# Patient Record
(demographics unavailable — no encounter records)

---

## 2024-12-03 NOTE — PHYSICAL EXAM
[Confidentiality] : confidentiality [STD (testing, results, tx)] : STD (testing, results, tx) [General Appearance - Alert] : alert [General Appearance - In No Acute Distress] : in no acute distress [General Appearance - Well Nourished] : well nourished [General Appearance - Well Developed] : well developed [General Appearance - Well-Appearing] : well appearing [Appearance Of Head] : the head was normocephalic [Facies] : there were no dysmorphic facial features [Sclera] : the conjunctiva were normal [Outer Ear] : the ears and nose were normal in appearance [Examination Of The Oral Cavity] : mucous membranes were moist and pink [Auscultation Breath Sounds / Voice Sounds] : breath sounds clear to auscultation bilaterally [Normal Chest Appearance] : the chest was normal in appearance [Apical Impulse] : quiet precordium with normal apical impulse [Heart Rate And Rhythm] : normal heart rate and rhythm [Heart Sounds] : normal S1 and S2 [No Murmur] : no murmurs  [Heart Sounds Gallop] : no gallops [Heart Sounds Pericardial Friction Rub] : no pericardial rub [Edema] : no edema [Arterial Pulses] : normal upper and lower extremity pulses with no pulse delay [Heart Sounds Click] : no clicks [Capillary Refill Test] : normal capillary refill [Bowel Sounds] : normal bowel sounds [Abdomen Soft] : soft [Nondistended] : nondistended [Abdomen Tenderness] : non-tender [Nail Clubbing] : no clubbing  or cyanosis of the fingers [Motor Tone] : normal muscle strength and tone [Cervical Lymph Nodes Enlarged Anterior] : The anterior cervical nodes were normal [Cervical Lymph Nodes Enlarged Posterior] : The posterior cervical nodes were normal [] : no rash [Skin Lesions] : no lesions [Skin Turgor] : normal turgor [Demonstrated Behavior - Infant Nonreactive To Parents] : interactive [Mood] : mood and affect were appropriate for age [Demonstrated Behavior] : normal behavior [FreeTextEntry7] : Femoral Pulse +2 B/L; Posterior tibial pulse +2 B/L

## 2024-12-03 NOTE — DISCUSSION/SUMMARY
[FreeTextEntry1] : JEFERSON  is a healthy, thriving 1 month old who presents without identified concerns for congestive heart failure nor impaired cardiac output by his  past medical history nor on his  current physical exam. his  EKG and echocardiogram were further reassuring.   - JEFERSON was incidentally noted to have physiologic tricuspid, pulmonary regurgitation in otherwise well functioning valves. This was not audible on physical exam and not hemodynamically significant at this time.   - Mild flow acceleration into the right pulmonary artery; < 2 m/s consistent with physiologic pulmonary artery stenosis; no appreciable correlating murmur on physical exam. Will re-evaluate at follow up for resolution or sooner should murmur develop.  -Isolated family member on both maternal (MGF) and paternal (PGF) side of an aortic aneurysm; not otherwise specified. Denies known history of connective tissue disorder nor bicuspid aortic valve and impacted family members later in life. I explained potential for inheritable causes but difficult in assessing risk for Jeferson in the absence of a further diagnosis. Recommended screening of both his parents. Reassuring echo today in Jeferson but does not exclude future development.    I recommended 1 year follow up and we reviewed signs and symptoms that should prompt medical attention and sooner evaluation. Above information explained in detail with assistance of a colored diagram. Jeferson and family verbalized their understanding and all questions were answered.    [Needs SBE Prophylaxis] : [unfilled] does not need bacterial endocarditis prophylaxis [May participate in all age-appropriate activities] : [unfilled] May participate in all age-appropriate activities.

## 2024-12-03 NOTE — CONSULT LETTER
[Today's Date] : [unfilled] [Name] : Name: [unfilled] [] : : ~~ [Today's Date:] : [unfilled] [Consult] : I had the pleasure of evaluating your patient, [unfilled]. My full evaluation follows. [Consult - Single Provider] : Thank you very much for allowing me to participate in the care of this patient. If you have any questions, please do not hesitate to contact me. [Sincerely,] : Sincerely, [Dear  ___:] : Dear Dr. [unfilled]: [FreeTextEntry4] : Ana Acosta MD [FreeTextEntry5] : [address] [FreeTextEntry6] : [place] [address] [de-identified] : Edis Escobar DO MPH Pediatric Cardiology [facility]

## 2024-12-03 NOTE — CARDIOLOGY SUMMARY
[LVSF ___%] : LV Shortening Fraction [unfilled]% [de-identified] : 11/25/24 [FreeTextEntry1] : Normal sinus rhythm @ 139 bpm MA: 98 ms QRS: 60 ms  QTc: 423 ms P-R-T Axis (-50) Normal voltage and intervals No ST segment abnormalities No pre-excitation  [de-identified] : 11/25/24 [FreeTextEntry2] :  A complete 2D, M-mode, doppler and color flow doppler transthoracic pediatric echocardiogram was performed. The intracardiac anatomy and doppler flow profiles were otherwise normal appearing with the following:   Summary: 1. Acceleration of right pulmonary artery flow velocity < 2m/s, consistent with physiologic peripheral pulmonary stenosis. 2. No evidence of a significant atrial septal defect. 3. Physiologic tricuspid valve regurgitation. 4. Physiologic pulmonary valve regurgitation. 5. Normal right ventricular morphology with qualitatively normal size and systolic function. 6. Normal left ventricular size, morphology and systolic function. 7. No pericardial effusion.

## 2024-12-03 NOTE — REVIEW OF SYSTEMS
[Nl] : no feeding issues at this time. [___ Formula] : [unfilled] Formula  [Breastmilk] : Breastmilk ~M [___ ounces/feeding] : ~XIMENA dick/feeding [Acting Fussy] : not acting ~L fussy [Fever] : no fever [Wgt Loss (___ Lbs)] : no recent weight loss [Pallor] : not pale [Discharge] : no discharge [Redness] : no redness [Nasal Discharge] : no nasal discharge [Nasal Stuffiness] : no nasal congestion [Stridor] : no stridor [Cyanosis] : no cyanosis [Edema] : no edema [Diaphoresis] : not diaphoretic [Tachypnea] : not tachypneic [Wheezing] : no wheezing [Cough] : no cough [Being A Poor Eater] : not a poor eater [Vomiting] : no vomiting [Diarrhea] : no diarrhea [Decrease In Appetite] : appetite not decreased [Fainting (Syncope)] : no fainting [Dec Consciousness] :  no decrease in consciousness [Seizure] : no seizures [Hypotonicity (Flaccid)] : not hypotonic [Refusal to Bear Wgt] : normal weight bearing [Puffy Hands/Feet] : no hand/feet puffiness [Rash] : no rash [Hemangioma] : no hemangioma [Jaundice] : no jaundice [Wound problems] : no wound problems [Bruising] : no tendency for easy bruising [Swollen Glands] : no lymphadenopathy [Enlarged [place]] : the fontanelle was not enlarged [Hoarse Cry] : no hoarse cry [Failure To Thrive] : no failure to thrive [Penis Circumcised] : not circumcised [Undescended Testes] : no undescended testicle [Ambiguous Genitals] : genitals not ambiguous [Dec Urine Output] : no oliguria [Solid Foods] : No solid food at this time [FreeTextEntry3] : every 2 hrs.

## 2024-12-03 NOTE — REVIEW OF SYSTEMS
[Nl] : no feeding issues at this time. [___ Formula] : [unfilled] Formula  [Breastmilk] : Breastmilk ~M [___ ounces/feeding] : ~XIMENA dick/feeding [Acting Fussy] : not acting ~L fussy [Fever] : no fever [Wgt Loss (___ Lbs)] : no recent weight loss [Pallor] : not pale [Discharge] : no discharge [Redness] : no redness [Nasal Discharge] : no nasal discharge [Nasal Stuffiness] : no nasal congestion [Stridor] : no stridor [Cyanosis] : no cyanosis [Edema] : no edema [Diaphoresis] : not diaphoretic [Tachypnea] : not tachypneic [Wheezing] : no wheezing [Cough] : no cough [Being A Poor Eater] : not a poor eater [Vomiting] : no vomiting [Diarrhea] : no diarrhea [Decrease In Appetite] : appetite not decreased [Fainting (Syncope)] : no fainting [Dec Consciousness] :  no decrease in consciousness [Seizure] : no seizures [Hypotonicity (Flaccid)] : not hypotonic [Refusal to Bear Wgt] : normal weight bearing [Puffy Hands/Feet] : no hand/feet puffiness [Rash] : no rash [Hemangioma] : no hemangioma [Jaundice] : no jaundice [Wound problems] : no wound problems [Bruising] : no tendency for easy bruising [Swollen Glands] : no lymphadenopathy [Enlarged [place]] : the fontanelle was not enlarged [Hoarse Cry] : no hoarse cry [Failure To Thrive] : no failure to thrive [Penis Circumcised] : not circumcised [Undescended Testes] : no undescended testicle [Ambiguous Genitals] : genitals not ambiguous [Dec Urine Output] : no oliguria [Solid Foods] : No solid food at this time [FreeTextEntry3] : every 2 hrs.

## 2024-12-03 NOTE — REASON FOR VISIT
[Initial Evaluation] : an initial evaluation of [Parents] : parents [FreeTextEntry3] : fetal follow up

## 2024-12-03 NOTE — CARDIOLOGY SUMMARY
[LVSF ___%] : LV Shortening Fraction [unfilled]% [de-identified] : 11/25/24 [FreeTextEntry1] : Normal sinus rhythm @ 139 bpm FL: 98 ms QRS: 60 ms  QTc: 423 ms P-R-T Axis (-50) Normal voltage and intervals No ST segment abnormalities No pre-excitation  [de-identified] : 11/25/24 [FreeTextEntry2] :  A complete 2D, M-mode, doppler and color flow doppler transthoracic pediatric echocardiogram was performed. The intracardiac anatomy and doppler flow profiles were otherwise normal appearing with the following:   Summary: 1. Acceleration of right pulmonary artery flow velocity < 2m/s, consistent with physiologic peripheral pulmonary stenosis. 2. No evidence of a significant atrial septal defect. 3. Physiologic tricuspid valve regurgitation. 4. Physiologic pulmonary valve regurgitation. 5. Normal right ventricular morphology with qualitatively normal size and systolic function. 6. Normal left ventricular size, morphology and systolic function. 7. No pericardial effusion.

## 2024-12-03 NOTE — HISTORY OF PRESENT ILLNESS
[FreeTextEntry1] : George is a well appearing, alert one month old who presents for a follow up evaluation for his post- evaluation after having had a fetal echocardiogram. He presents doing well.  Fetal echo report reviewed ( Dr. Dykes); no reported abnormalities. Indication twin gestation with 3VV. C/s. No reported prenatal complications.  George presents doing well. There has been no unexplained crying or irritability to suggest chest pain or palpitations. There has been no cyanosis, shortness of breath, dizziness, lightheadedness, syncope or near syncope. No reported history of feeding difficulties. No associated increased work of breathing, prolonged feeding duration, diaphoresis or early fatigue. Interactive, alert; without excessive fatigue or activity induced symptoms. Good appetite, remaining well hydrated. Normal urine output. No reported concerns with growth or development. There has been no recent fevers, illnesses or hospitalizations. No known sick contacts. Multiple hemangioma: abdomen, head, foot. Observation for now. No treatment currently.   Dad: Innocent heart murmur; no recent cardiac evaluation Mom: No recent cardiac evaluation MGF: AA PGF: AA  No additionally reported family history of an arrhythmia, aortic aneurysm, unexplained death, bicuspid aortic valve, congenital heart disease, cardiomyopathy or sudden cardiac death, long QT syndrome, drowning or unexplained accidental death.

## 2024-12-03 NOTE — CONSULT LETTER
[Today's Date] : [unfilled] [Name] : Name: [unfilled] [] : : ~~ [Today's Date:] : [unfilled] [Consult] : I had the pleasure of evaluating your patient, [unfilled]. My full evaluation follows. [Consult - Single Provider] : Thank you very much for allowing me to participate in the care of this patient. If you have any questions, please do not hesitate to contact me. [Sincerely,] : Sincerely, [Dear  ___:] : Dear Dr. [unfilled]: [FreeTextEntry4] : Ana Acosta MD [FreeTextEntry5] : [address] [FreeTextEntry6] : [place] [address] [de-identified] : Edis Escobar DO MPH Pediatric Cardiology [facility]

## 2024-12-17 NOTE — ASSESSMENT
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 These lesions occur in 5-10% of all children in the [place] and represent a benign tumor of blood vessels and endothelial cells. The vast majority of hemangiomas are uncomplicated and are followed conservatively without therapy. Treatment is indicated, however, for disfiguring, bleeding, ulcerated or medically-complicated lesions. Timolol ophthalmic gel forming solution- apply 1 drop BID to infantile hemangioma on skin. Counseled mom to keep the medication in a safe place in order to avoid accidental ingestion of the medication. If this happens, she was instructed to take her to the ED immediately. Also counseled to call us if the lesion bleeds or ulcerates  Xerosis Education and anticipatory guidance provided. Dry skin care handout reviewed  Nevus simplex Education and anticipatory guidance provided. occiput and back  f/u 1m

## 2024-12-17 NOTE — PHYSICAL EXAM
[FreeTextEntry3] : red plaque on head, abdomen, R foot pink patch occiput and back rough patch on cheek

## 2024-12-17 NOTE — HISTORY OF PRESENT ILLNESS
[FreeTextEntry1] : NP: Hemangioma [de-identified] : 2m old M ex 38 wk twin presents with 3 hemangioma (head, r foot, abdomen). Developing well. No prior tx.  Also with rough patches S+M: Aveeno D: Dayana

## 2024-12-17 NOTE — CONSULT LETTER
[Dear  ___] : Dear  [unfilled], [Consult Letter:] : I had the pleasure of evaluating your patient, [unfilled]. [Please see my note below.] : Please see my note below. [Consult Closing:] : Thank you very much for allowing me to participate in the care of this patient.  If you have any questions, please do not hesitate to contact me. [Sincerely,] : Sincerely, [FreeTextEntry3] : Tuyet Orellana MD Pediatric Dermatology [facility]

## 2025-01-23 NOTE — CONSULT LETTER
[FreeTextEntry1] : Dear Dr. JOSE PATTERSON ,  I had the pleasure of consulting on JEFERSON BARKERO today.  Below is my note regarding the office visit today.  Thank you so very much for allowing me to participate in JEFERSON's  care.  Please don't hesitate to call me should any questions or issues arise .  Sincerely,   Hong Romero MD, FACS, PU Chief, Pediatric Urology Professor of Urology and Pediatrics [employer]  President, [employer] U[employer] Association - [place] Section Past-President, Societies for Pediatric Urology

## 2025-01-23 NOTE — PHYSICAL EXAM
[TextBox_92] :  PENIS: Straight uncircumcised penis with phimosis and mildly eccentric prepuce opening.  Meatus visible in the orthotopic position SCROTUM: Bilaterally symmetric testes in dependent position without palpable mass, hernia, hydrocele

## 2025-01-23 NOTE — CONSULT LETTER
[FreeTextEntry1] : Dear Dr. JOSE PATTERSON ,  I had the pleasure of consulting on JEFERSON BARKERO today.  Below is my note regarding the office visit today.  Thank you so very much for allowing me to participate in JEFERSON's  care.  Please don't hesitate to call me should any questions or issues arise .  Sincerely,   Hong Romero MD, FACS, PU Chief, Pediatric Urology Professor of Urology and Pediatrics [facility] [employer]  President, American U[employer] Association - [place] Section Past-President, Societies for Pediatric Urology

## 2025-01-23 NOTE — ASSESSMENT
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 The preputial abnormality is of no physiologic consequence. I discussed the findings with the family.  The family is not interested in pursuing a circumcision.  I instructed them to follow up for any future urologic issues.  All questions answered.

## 2025-01-23 NOTE — CONSULT LETTER
[FreeTextEntry1] : Dear Dr. JOSE PATTERSON ,  I had the pleasure of consulting on JEFERSON BARKERO today.  Below is my note regarding the office visit today.  Thank you so very much for allowing me to participate in JEFERSON's  care.  Please don't hesitate to call me should any questions or issues arise .  Sincerely,   Hong Romero MD, FACS, PU Chief, Pediatric Urology Professor of Urology and Pediatrics [facility] [employer] [employer]  President, American U[employer] Association - [place] Section Past-President, Societies for Pediatric Urology

## 2025-01-23 NOTE — HISTORY OF PRESENT ILLNESS
[TextBox_4] : JEFERSON presents today for an evaluation.  He was born at term after an unassisted conception and uneventful pregnancy and delivery.  A deformity of the penis was detected in the nursery. Making ample wet diapers.  No infections.

## 2025-02-06 NOTE — ASSESSMENT
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 These lesions occur in 5-10% of all children in the [place] and represent a benign tumor of blood vessels and endothelial cells. The vast majority of hemangiomas are uncomplicated and are followed conservatively without therapy. Treatment is indicated, however, for disfiguring, bleeding, ulcerated or medically-complicated lesions. c/wTimolol ophthalmic gel forming solution- apply 1 drop BID to infantile hemangioma on skin. Counseled mom to keep the medication in a safe place in order to avoid accidental ingestion of the medication. If this happens, she was instructed to take her to the ED immediately. Also counseled to call us if the lesion bleeds or ulcerates  Xerosis Education and anticipatory guidance provided. Dry skin care handout reviewed  Nevus simplex Education and anticipatory guidance provided. occiput and back  f/u 2m

## 2025-02-06 NOTE — PHYSICAL EXAM
[Alert] : alert [Well Nourished] : well nourished [Conjunctiva Non-injected] : conjunctiva non-injected [No Visual Lymphadenopathy] : no visual  lymphadenopathy [No Clubbing] : no clubbing [No Edema] : no edema [No Bromhidrosis] : no bromhidrosis [No Chromhidrosis] : no chromhidrosis [FreeTextEntry3] : red plaque on head, abdomen, R foot pink patch occiput and back minimal dryness

## 2025-02-06 NOTE — CONSULT LETTER
[Dear  ___] : Dear  [unfilled], [Consult Letter:] : I had the pleasure of evaluating your patient, [unfilled]. [Please see my note below.] : Please see my note below. [Consult Closing:] : Thank you very much for allowing me to participate in the care of this patient.  If you have any questions, please do not hesitate to contact me. [Sincerely,] : Sincerely, [FreeTextEntry3] : Tuyet Orellana MD Pediatric Dermatology [facility]

## 2025-02-06 NOTE — HISTORY OF PRESENT ILLNESS
[FreeTextEntry1] : f/u: Hemangioma [de-identified] : 3m old M ex 38 wk twin presents with 3 hemangioma (head, r foot, abdomen). Developing well. On timolol, no interval growth Twin brother- Nawaf- mom noticed IH on back over last 2 weeks  Rough patches now improved was using: S+M: Aveeno D: Dayana

## 2025-04-02 NOTE — ASSESSMENT
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 These lesions occur in 5-10% of all children in the [place] and represent a benign tumor of blood vessels and endothelial cells. The vast majority of hemangiomas are uncomplicated and are followed conservatively without therapy. Treatment is indicated, however, for disfiguring, bleeding, ulcerated or medically-complicated lesions. c/w Timolol ophthalmic gel forming solution- apply 1 drop BID to infantile hemangioma on skin. Counseled mom to keep the medication in a safe place in order to avoid accidental ingestion of the medication. If this happens, she was instructed to take her to the ED immediately. Also counseled to call us if the lesion bleeds or ulcerates  Atopic derm- flaring on body, xerosis -start HC ointment to face BID PRN roughness avoid eyes, SED -Start Triamcinolone 0.1% ointment BID PRN roughness on thin plaques on body avoid face or groin, SED  Dry skin care reviewed:   - Take short showers/baths (avoid hot water)  - Use a mild soap (eg. CeraVe cleanser or Aquaphor)  - Use soap only on areas truly needed (underarms,groin,buttocks,fold areas, feet, face, hair)  - Pat off excess water and put moisturizer on immediately (within 3 min.)              Good moisturizing choices include:                       1. Cetaphil cream (not baby Cetaphil)                       2. CeraVe cream                       3. Vanicream cream                       4. Aquaphor ointment                       5. Vaseline ointment                       6. CeraVe ointment  - A moisturizer should always be applied after showering or bathing, but may be applied as many additional times as is necessary.   Nevus simplex Education and anticipatory guidance provided. occiput and back  f/u 3m

## 2025-04-02 NOTE — PHYSICAL EXAM
[Alert] : alert [Well Nourished] : well nourished [Conjunctiva Non-injected] : conjunctiva non-injected [No Visual Lymphadenopathy] : no visual  lymphadenopathy [No Clubbing] : no clubbing [No Edema] : no edema [No Bromhidrosis] : no bromhidrosis [No Chromhidrosis] : no chromhidrosis [FreeTextEntry3] : red plaque on head, abdomen, R foot pink patch occiput and back rough patches on flexures of extremities

## 2025-04-02 NOTE — HISTORY OF PRESENT ILLNESS
[FreeTextEntry1] : f/u: Hemangioma [de-identified] : 5 m old M ex 38 wk twin presents with 3 hemangioma (head, r foot, abdomen). Developing well. On timolol, no interval growth Twin brother- Nawaf- has IH on back   Rough patches on creases of arms, roughness on legs was using: S: Aquaphor M: CeraVe cream D: Ultra

## 2025-04-02 NOTE — CONSULT LETTER
[Dear  ___] : Dear  [unfilled], [Consult Letter:] : I had the pleasure of evaluating your patient, [unfilled]. [Please see my note below.] : Please see my note below. [Consult Closing:] : Thank you very much for allowing me to participate in the care of this patient.  If you have any questions, please do not hesitate to contact me. [Sincerely,] : Sincerely, [FreeTextEntry3] : Tuyet Orellana MD Pediatric Dermatology [facility]

## 2025-07-14 NOTE — HISTORY OF PRESENT ILLNESS
[FreeTextEntry1] : f/u: Hemangioma [de-identified] : 9 m old M ex 38 wk twin presents with 3 hemangioma (head, r foot, abdomen). Developing well. On timolol, no interval growth With dry patches diffusely. Mom used HCT ointment for 5 days only then stopped. Not using regularly.  Twin brother- Nawaf- has IH on back   Rough patches on creases of arms, roughness on legs was using: S: Aquaphor M: CeraVe cream D: Ultra

## 2025-07-14 NOTE — ASSESSMENT
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 These lesions occur in 5-10% of all children in the [place] and represent a benign tumor of blood vessels and endothelial cells. The vast majority of hemangiomas are uncomplicated and are followed conservatively without therapy. Treatment is indicated, however, for disfiguring, bleeding, ulcerated or medically-complicated lesions. c/w Timolol ophthalmic gel forming solution- apply 1 drop BID to infantile hemangioma on skin. Counseled mom to keep the medication in a safe place in order to avoid accidental ingestion of the medication. If this happens, she was instructed to take her to the ED immediately. Also counseled to call us if the lesion bleeds or ulcerates  #Atopic derm- flaring on body, xerosis -Restart HC ointment to flaring areas on face or body BID PRN roughness avoid eyes, SED Dry skin care reviewed:   - Take short showers/baths (avoid hot water)  - Use a mild soap (eg. CeraVe cleanser or Aquaphor)  - Use soap only on areas truly needed (underarms,groin,buttocks,fold areas, feet, face, hair)  - Pat off excess water and put moisturizer on immediately (within 3 min.)              Good moisturizing choices include:                       1. Cetaphil cream (not baby Cetaphil)                       2. CeraVe cream                       3. Vanicream cream                       4. Aquaphor ointment                       5. Vaseline ointment                       6. CeraVe ointment  - A moisturizer should always be applied after showering or bathing, but may be applied as many additional times as is necessary.   Nevus simplex Education and anticipatory guidance provided. occiput and back  f/u 3m

## 2025-07-14 NOTE — HISTORY OF PRESENT ILLNESS
[FreeTextEntry1] : f/u: Hemangioma [de-identified] : 9 m old M ex 38 wk twin presents with 3 hemangioma (head, r foot, abdomen). Developing well. On timolol, no interval growth With dry patches diffusely. Mom used HCT ointment for 5 days only then stopped. Not using regularly.  Twin brother- Nawaf- has IH on back   Rough patches on creases of arms, roughness on legs was using: S: Aquaphor M: CeraVe cream D: Ultra

## 2025-07-14 NOTE — CONSULT LETTER
[Dear  ___] : Dear  [unfilled], [Consult Letter:] : I had the pleasure of evaluating your patient, [unfilled]. [Please see my note below.] : Please see my note below. [Consult Closing:] : Thank you very much for allowing me to participate in the care of this patient.  If you have any questions, please do not hesitate to contact me. [Sincerely,] : Sincerely, [FreeTextEntry3] : Tuyet Orellana MD Pediatric Dermatology [facility]

## 2025-07-14 NOTE — CONSULT LETTER
[Dear  ___] : Dear  [unfilled], [Consult Letter:] : I had the pleasure of evaluating your patient, [unfilled]. [Please see my note below.] : Please see my note below. [Consult Closing:] : Thank you very much for allowing me to participate in the care of this patient.  If you have any questions, please do not hesitate to contact me. [Sincerely,] : Sincerely, [FreeTextEntry3] : Tuyet Orellana MD Pediatric Dermatology [facility]